# Patient Record
Sex: FEMALE | Race: OTHER | URBAN - METROPOLITAN AREA
[De-identification: names, ages, dates, MRNs, and addresses within clinical notes are randomized per-mention and may not be internally consistent; named-entity substitution may affect disease eponyms.]

---

## 2023-08-17 ENCOUNTER — OFFICE VISIT (OUTPATIENT)
Dept: PODIATRY | Facility: CLINIC | Age: 48
End: 2023-08-17
Payer: MEDICARE

## 2023-08-17 VITALS
HEART RATE: 125 BPM | BODY MASS INDEX: 31.65 KG/M2 | DIASTOLIC BLOOD PRESSURE: 106 MMHG | WEIGHT: 172 LBS | HEIGHT: 62 IN | SYSTOLIC BLOOD PRESSURE: 150 MMHG

## 2023-08-17 DIAGNOSIS — M72.2 PLANTAR FASCIITIS: Primary | ICD-10-CM

## 2023-08-17 DIAGNOSIS — E11.40 TYPE 2 DIABETES MELLITUS WITH DIABETIC NEUROPATHY, UNSPECIFIED WHETHER LONG TERM INSULIN USE (HCC): ICD-10-CM

## 2023-08-17 DIAGNOSIS — B35.1 ONYCHOMYCOSIS: ICD-10-CM

## 2023-08-17 DIAGNOSIS — M79.672 LEFT FOOT PAIN: ICD-10-CM

## 2023-08-17 PROCEDURE — 99204 OFFICE O/P NEW MOD 45 MIN: CPT

## 2023-08-17 PROCEDURE — 20550 NJX 1 TENDON SHEATH/LIGAMENT: CPT

## 2023-08-17 RX ORDER — HYDROXYZINE HYDROCHLORIDE 25 MG/1
25 TABLET, FILM COATED ORAL EVERY 6 HOURS PRN
COMMUNITY
Start: 2023-05-18

## 2023-08-17 RX ORDER — UPADACITINIB 15 MG/1
TABLET, EXTENDED RELEASE ORAL
COMMUNITY
Start: 2023-05-28

## 2023-08-17 RX ORDER — SEMAGLUTIDE 0.68 MG/ML
INJECTION, SOLUTION SUBCUTANEOUS
COMMUNITY
Start: 2023-08-01

## 2023-08-17 RX ORDER — TRAMADOL HYDROCHLORIDE 50 MG/1
50 TABLET ORAL EVERY 6 HOURS PRN
COMMUNITY
Start: 2023-06-05 | End: 2024-06-04

## 2023-08-17 RX ORDER — TRAMADOL HYDROCHLORIDE 50 MG/1
TABLET ORAL
COMMUNITY
Start: 2023-07-12

## 2023-08-17 RX ORDER — LIDOCAINE HYDROCHLORIDE 10 MG/ML
1 INJECTION, SOLUTION EPIDURAL; INFILTRATION; INTRACAUDAL; PERINEURAL ONCE
Status: COMPLETED | OUTPATIENT
Start: 2023-08-17 | End: 2023-08-17

## 2023-08-17 RX ORDER — HYDROXYZINE HYDROCHLORIDE 25 MG/1
TABLET, FILM COATED ORAL
COMMUNITY
Start: 2023-05-18

## 2023-08-17 RX ORDER — TRIAMCINOLONE ACETONIDE 40 MG/ML
20 INJECTION, SUSPENSION INTRA-ARTICULAR; INTRAMUSCULAR ONCE
Status: COMPLETED | OUTPATIENT
Start: 2023-08-17 | End: 2023-08-17

## 2023-08-17 RX ORDER — MOMETASONE FUROATE 1 MG/G
CREAM TOPICAL
COMMUNITY
Start: 2023-06-02

## 2023-08-17 RX ADMIN — LIDOCAINE HYDROCHLORIDE 1 ML: 10 INJECTION, SOLUTION EPIDURAL; INFILTRATION; INTRACAUDAL; PERINEURAL at 14:12

## 2023-08-17 RX ADMIN — TRIAMCINOLONE ACETONIDE 20 MG: 40 INJECTION, SUSPENSION INTRA-ARTICULAR; INTRAMUSCULAR at 14:12

## 2023-08-17 NOTE — PROGRESS NOTES
PATIENT:  Jeny Inman  1975       ASSESSMENT:     1. Plantar fasciitis  triamcinolone acetonide (KENALOG-40) 40 mg/mL injection 20 mg    lidocaine (PF) (XYLOCAINE-MPF) 1 % injection 1 mL    Foot injection      2. Left foot pain        3. Onychomycosis  ciclopirox (PENLAC) 8 % solution      4. Type 2 diabetes mellitus with diabetic neuropathy, unspecified whether long term insulin use ([address])              PLAN:  1. Reviewed medical records. Patient was counseled and educated on the condition and the diagnosis. 2. The exam and symptoms are consistent with plantar fasciitis. The diagnosis, treatment options and prognosis were discussed with the patient. 3. Treatment options were discussed and the patient wished to proceed with an injection. See procedure. 4. Instructed supportive care, home exercise, icing, and proper footwear/ arch support. Discussed possible images depending on the progress. 5. She has elevated ALT and AST from last LFT. I would not recommend oral antifungal.  Will use Penlac for onychomycosis. 6. Patient was counseled on the condition and diagnosis. Educated disease prevention and risks related to diabetes. 7. Educated proper daily foot care and exam.  Instructed proper skin care / protection and footwear. Instructed to identify any signs of infection and related foot problem. 8. Reviewed the note from PCP. The recent blood work was reviewed / discussed and the last HbA1c was 8.2. Discussed proper blood glucose control with diet and exercise. 9. The patient will return in 6 weeks. Imaging: I have personally reviewed pertinent films in PACS  Labs, pathology, and Other Studies: I have personally reviewed pertinent reports. Foot injection     Date/Time 8/17/2023 1:45 PM     Performed by  Mauro Mora DPM   Authorized by Mauro Mora DPM     Universal Protocol   Consent: Verbal consent obtained.   Risks and benefits: risks, benefits and alternatives were discussed  Consent given by: patient  Time out: Immediately prior to procedure a "time out" was called to verify the correct patient, procedure, equipment, support staff and site/side marked as required. Timeout called at: 8/17/2023 2:28 PM.  Patient understanding: patient states understanding of the procedure being performed  Site marked: the operative site was marked  Patient identity confirmed: verbally with patient       Procedure Details   Procedure Notes:   Treatment options were discussed and the patient wished to proceed with an injection. A trigger point injection was given to left plantar medial heel using 0.5cc Kenolog 40 and 2cc 1% Lidocaine pl. Instructed supportive care, icing, and resting. Patient tolerance: Patient tolerated the procedure well with no immediate complications               Subjective:       HPI  The patient presents with chief complaint of pain in left heel for about 6 months. The symptoms presents with walking and standing. Pain level is about 8-9 out of 10. The symptoms have been worse since the onset. The patient has more pain in the morning and after sitting for a while. The patient does not recall any injury, but reports some overuse. The patient tried OTC meds, and different shoes without relieving the pain. Denied any swelling. She also complained of chronic nail fungus. The patient has diabetes for less than a year. The blood glucose was up a little. The patient denied any history of diabetic foot ulcer or related foot infection. She has mild numbness and tingling sensation in her feet. Denied  significant functional deficit. The following portions of the patient's history were reviewed and updated as appropriate: allergies, current medications, past family history, past medical history, past social history, past surgical history and problem list.  All pertinent labs and images were reviewed.       Past Medical History  History reviewed. No pertinent past medical history. Past Surgical History  History reviewed. No pertinent surgical history. Allergies:  Cephalexin, Ciprofloxacin, Doxycycline, Gabapentin, Levofloxacin, Losartan, Metoclopramide, Nitrofurantoin, Penicillins, and Prednisone    Medications:  Current Outpatient Medications   Medication Sig Dispense Refill   • ciclopirox (PENLAC) 8 % solution Apply topically daily at bedtime 6.6 mL 10   • hydrOXYzine HCL (ATARAX) 25 mg tablet Take 25 mg by mouth every 6 (six) hours as needed     • metFORMIN (GLUCOPHAGE) 1000 MG tablet Take 500 mg by mouth     • semaglutide, 0.25 or 0.5 mg/dose, (Ozempic) 2 mg/1.5 mL injection pen Inject 0.25 mg under the skin Once a week     • traMADol (ULTRAM) 50 mg tablet Take 50 mg by mouth every 6 (six) hours as needed     • Upadacitinib ER (Rinvoq) 15 MG TB24      • hydrOXYzine HCL (ATARAX) 25 mg tablet take 1 tablet by mouth every 6 hours if needed for itching     • metFORMIN (GLUCOPHAGE) 1000 MG tablet take 1 tablet by mouth EVERY MORNING WITH BREAKFAST     • mometasone (ELOCON) 0.1 % cream apply topically to affected area daily     • Ozempic, 0.25 or 0.5 MG/DOSE, 2 MG/3ML injection pen inject 0.25 milligrams subcutaneously every week     • Rinvoq 15 MG TB24      • traMADol (ULTRAM) 50 mg tablet take 1 tablet by mouth every 6 hours if needed for MODERATE PAIN ( PAIN SCALE 4-6 )       No current facility-administered medications for this visit.        Social History:  Social History     Socioeconomic History   • Marital status: Single     Spouse name: None   • Number of children: None   • Years of education: None   • Highest education level: None   Occupational History   • None   Tobacco Use   • Smoking status: None   • Smokeless tobacco: None   Substance and Sexual Activity   • Alcohol use: None   • Drug use: None   • Sexual activity: None   Other Topics Concern   • None   Social History Narrative   • None     Social Determinants of Health     Financial Resource Strain: Not on file   Food Insecurity: Not on file   Transportation Needs: Not on file   Physical Activity: Not on file   Stress: Not on file   Social Connections: Not on file   Intimate Partner Violence: Not on file   Housing Stability: Not on file          Review of Systems   Constitutional: Negative for chills and fever. Respiratory: Negative for cough and shortness of breath. Cardiovascular: Negative for chest pain and leg swelling. Gastrointestinal: Negative for nausea and vomiting. Musculoskeletal: Positive for arthralgias. Skin: Negative for rash and wound. Neurological: Positive for numbness. Negative for tremors. Hematological: Negative. Psychiatric/Behavioral: Negative for behavioral problems and confusion. Objective:      BP (!) 150/106   Pulse (!) 125   Ht 5' 2" (1.575 m)   Wt 78 kg (172 lb)   BMI 31.46 kg/m²          Physical Exam  Vitals reviewed. Constitutional:       General: She is not in acute distress. Appearance: She is not toxic-appearing or diaphoretic. HENT:      Head: Normocephalic and atraumatic. Eyes:      Extraocular Movements: Extraocular movements intact. Cardiovascular:      Rate and Rhythm: Normal rate and regular rhythm. Pulses: Normal pulses. no weak pulses          Dorsalis pedis pulses are 2+ on the right side and 2+ on the left side. Posterior tibial pulses are 2+ on the right side and 2+ on the left side. Pulmonary:      Effort: Pulmonary effort is normal. No respiratory distress. Musculoskeletal:         General: Tenderness present. No swelling, deformity or signs of injury. Cervical back: Normal range of motion and neck supple. Right lower leg: No edema. Left lower leg: No edema. Right foot: No Charcot foot or foot drop. Left foot: No Charcot foot or foot drop. Comments: Focal pain in left plantar medial heel. No pain on lateral compression of left heel.   Tight achilles/ calf with ankle equinus. Feet:      Right foot:      Protective Sensation: 10 sites tested. 10 sites sensed. Skin integrity: No ulcer, skin breakdown or erythema. Left foot:      Protective Sensation: 10 sites tested. 10 sites sensed. Skin integrity: No ulcer, skin breakdown or erythema. Skin:     General: Skin is warm. Capillary Refill: Capillary refill takes less than 2 seconds. Coloration: Skin is not cyanotic or mottled. Findings: No abscess, ecchymosis, erythema or wound. Nails: There is no clubbing. Comments: Thick, mycotic toenails noted, right worse than left. Neurological:      General: No focal deficit present. Mental Status: She is alert and oriented to person, place, and time. Cranial Nerves: No cranial nerve deficit. Sensory: No sensory deficit. Motor: No weakness. Coordination: Coordination normal.   Psychiatric:         Mood and Affect: Mood normal.         Behavior: Behavior normal.         Thought Content: Thought content normal.         Judgment: Judgment normal.           Diabetic Foot Exam    Patient's shoes and socks removed. Right Foot/Ankle   Right Foot Inspection  Skin Exam: skin intact. No erythema, no maceration, no pre-ulcer and no ulcer. Toe Exam: No swelling, no tenderness, erythema and  no right toe deformity    Sensory   Vibration: diminished  Proprioception: intact  Monofilament testing: intact    Vascular  Capillary refills: < 3 seconds  The right DP pulse is 2+. The right PT pulse is 2+. Left Foot/Ankle  Left Foot Inspection  Skin Exam: skin intact. No erythema, no maceration, no pre-ulcer and no ulcer. Toe Exam: No swelling, no tenderness, no erythema and no left toe deformity. Sensory   Vibration: diminished  Proprioception: intact  Monofilament testing: intact    Vascular  Capillary refills: < 3 seconds  The left DP pulse is 2+. The left PT pulse is 2+.      Assign Risk Category  No deformity present  No loss of protective sensation  No weak pulses  Risk: 0